# Patient Record
Sex: FEMALE | Race: WHITE | NOT HISPANIC OR LATINO | Employment: FULL TIME | URBAN - METROPOLITAN AREA
[De-identification: names, ages, dates, MRNs, and addresses within clinical notes are randomized per-mention and may not be internally consistent; named-entity substitution may affect disease eponyms.]

---

## 2023-12-26 ENCOUNTER — APPOINTMENT (OUTPATIENT)
Dept: PRIMARY CARE | Facility: CLINIC | Age: 23
End: 2023-12-26
Payer: COMMERCIAL

## 2024-01-18 ENCOUNTER — OFFICE VISIT (OUTPATIENT)
Dept: PRIMARY CARE | Facility: CLINIC | Age: 24
End: 2024-01-18
Payer: COMMERCIAL

## 2024-01-18 VITALS
DIASTOLIC BLOOD PRESSURE: 60 MMHG | HEIGHT: 63 IN | SYSTOLIC BLOOD PRESSURE: 98 MMHG | HEART RATE: 65 BPM | BODY MASS INDEX: 21.69 KG/M2 | TEMPERATURE: 97.7 F | OXYGEN SATURATION: 99 % | WEIGHT: 122.4 LBS

## 2024-01-18 DIAGNOSIS — D64.9 ANEMIA, UNSPECIFIED TYPE: ICD-10-CM

## 2024-01-18 DIAGNOSIS — Z00.00 ANNUAL PHYSICAL EXAM: Primary | ICD-10-CM

## 2024-01-18 DIAGNOSIS — Z80.3 FAMILY HISTORY OF BREAST CANCER IN MOTHER: ICD-10-CM

## 2024-01-18 DIAGNOSIS — G43.109 MIGRAINE WITH AURA AND WITHOUT STATUS MIGRAINOSUS, NOT INTRACTABLE: ICD-10-CM

## 2024-01-18 DIAGNOSIS — H53.19 VISUAL SNOW SYNDROME: ICD-10-CM

## 2024-01-18 DIAGNOSIS — Z83.49 FAMILY HISTORY OF 5,10-METHYLENETETRAHYDROFOLATE REDUCTASE (MTHFR) DEFICIENCY: ICD-10-CM

## 2024-01-18 PROBLEM — G44.89 OTHER HEADACHE SYNDROME: Status: ACTIVE | Noted: 2024-01-18

## 2024-01-18 PROBLEM — R19.7 DIARRHEA: Status: ACTIVE | Noted: 2020-08-18

## 2024-01-18 PROBLEM — L70.0 ACNE VULGARIS: Status: ACTIVE | Noted: 2024-01-18

## 2024-01-18 PROBLEM — M79.669 PAIN IN THE SHINS: Status: ACTIVE | Noted: 2024-01-18

## 2024-01-18 PROBLEM — F43.22 ADJUSTMENT DISORDER WITH ANXIETY: Status: ACTIVE | Noted: 2024-01-18

## 2024-01-18 PROBLEM — N92.6 IRREGULAR MENSES: Status: ACTIVE | Noted: 2024-01-18

## 2024-01-18 PROBLEM — J02.9 SORE THROAT: Status: ACTIVE | Noted: 2024-01-18

## 2024-01-18 PROBLEM — J03.90 ACUTE TONSILLITIS: Status: ACTIVE | Noted: 2024-01-18

## 2024-01-18 PROBLEM — N94.6 DYSMENORRHEA: Status: ACTIVE | Noted: 2024-01-18

## 2024-01-18 PROBLEM — H53.9 VISUAL DISTURBANCE: Status: ACTIVE | Noted: 2024-01-18

## 2024-01-18 PROBLEM — R63.4 ABNORMAL WEIGHT LOSS: Status: ACTIVE | Noted: 2024-01-18

## 2024-01-18 PROCEDURE — 1036F TOBACCO NON-USER: CPT | Performed by: NURSE PRACTITIONER

## 2024-01-18 PROCEDURE — 99385 PREV VISIT NEW AGE 18-39: CPT | Performed by: NURSE PRACTITIONER

## 2024-01-18 RX ORDER — CLINDAMYCIN HYDROCHLORIDE 300 MG/1
300 CAPSULE ORAL EVERY 8 HOURS
COMMUNITY
Start: 2023-06-29 | End: 2024-01-18 | Stop reason: ALTCHOICE

## 2024-01-18 RX ORDER — SPIRONOLACTONE 50 MG/1
TABLET, FILM COATED ORAL
COMMUNITY
End: 2024-01-18 | Stop reason: ALTCHOICE

## 2024-01-18 ASSESSMENT — PAIN SCALES - GENERAL: PAINLEVEL: 0-NO PAIN

## 2024-01-18 NOTE — PROGRESS NOTES
"Subjective   Patient ID: Cass Maxwell is a 23 y.o. female who presents for Annual Exam.    HPI  Pleasant 24 y/o female with PMH of Acne, anemia, Raynauds, Irregular menses presents for Annual and to establish care.  Current concerns include  Irregular periods, reports cycle is never consistent, reports high levels of pain where she is doubled over, can't walk, doesn't want to do anything, not resolved with otc medications. Reports Gyn workup for endometriosis in 2021 was negative with no further follow up  Not interested in birth control, discussed options to regulate periods  Endorses anemia but does not know why, no supplementation    Family history of breast cancer- Reports  mom  recently diagnosed with breast cancer at age 48, reports several other maternal family members also with breast cancer. Mom also recently found she is positive for the MTHFR deficiency    Raynauds- fingers and toes, exacerbated by the cold, well managed    Hx of migraine, Visual snow-reports she sees a haze in her vision \"like static with floaters\" all the time, has not had a vision exam? States she had a brain CT in high school to evaluate her symptoms  Migraines mostly resolved  Per EMR CT HEAD W/WO CONTRAST 2018 No acute intracranial process      Single lives with parents, safe  [employer] student in [employer]  no  Kids    Diet healthy, fruits or vegetables all meals  Caffeine 1  Water 64 oz  Exercise ski in Winter, teaches figure skating  Non-smoker  Alcohol No      Eye exam never, recommended she be evaluated  Dental exam 2023  Gyn no abnormal pap referral placed      Review of Systems  Review of Systems   Constitutional: Negative.    HENT: HPI   Respiratory: Negative.     Cardiovascular: Negative.    Gastrointestinal: Negative.    Genitourinary: HPI    Musculoskeletal: Negative.    Psychiatric/Behavioral: Negative.     All other systems reviewed and are negative.    .vsVisit Vitals  BP 98/60 (BP Location: Left arm, Patient " "Position: Sitting, BP Cuff Size: Adult)   Pulse 65   Temp 36.5 °C (97.7 °F) (Temporal)   Ht 1.6 m (5' 3\")   Wt 55.5 kg (122 lb 6.4 oz)   LMP 12/31/2023 (Exact Date)   SpO2 99%   BMI 21.68 kg/m²   OB Status Having periods   Smoking Status Never   BSA 1.57 m²         Objective   Physical Exam  HENT:      Head: Normocephalic and atraumatic.      Right Ear: Hearing and tympanic membrane normal.      Left Ear: Hearing and tympanic membrane normal.      Nose: Nose normal.      Mouth/Throat:      Lips: Pink.      Mouth: Mucous membranes are moist.      Pharynx: Oropharynx is clear. Uvula midline.         Physical Exam  Vitals reviewed.   Constitutional:       General: She is active.   Cardiovascular:      Rate and Rhythm: Normal rate and regular rhythm.   Pulmonary:      Effort: Pulmonary effort is normal.      Breath sounds: Normal breath sounds.   Abdominal:      General: Bowel sounds are normal.   Musculoskeletal:         General: Normal range of motion.      Cervical back: Neck supple.   Skin:     General: Skin is warm and dry.      Capillary Refill: Capillary refill takes less than 2 seconds.   Neurological:      General: No focal deficit present.      Mental Status: She is alert.     Assessment/Plan   Problem List Items Addressed This Visit       Anemia    Relevant Orders    Iron and TIBC    Vitamin B12    Migraine with aura and without status migrainosus, not intractable     Well controlled, no concern         Visual snow syndrome     Obtain complete eye exam         Annual physical exam - Primary    Relevant Orders    CBC    Comprehensive Metabolic Panel    Vitamin D 25-Hydroxy,Total (for eval of Vitamin D levels)    TSH with reflex to Free T4 if abnormal    Hemoglobin A1C    Lipid Panel    Homocysteine, serum    Iron and TIBC    Family history of breast cancer in mother     mom@48, other paternal family members  Mom currently undergoing genetic testing         Relevant Orders    Referral to Genetics     Other " Visit Diagnoses       Family history of 5,10-methylenetetrahydrofolate reductase (MTHFR) deficiency        Relevant Orders    Referral to Genetics

## 2024-01-18 NOTE — PATIENT INSTRUCTIONS
Get an eye exam    Health Tips for People in their 20's     Develop good health habits now  Don't put it off. With good health habits, you can prevent or reduce the likelihood of health-impacting conditions later in life, such as obesity, high blood pressure, heart disease, or diabetes. Establishing good health habits now is easier than having to begin these practices later on, or to have to break bad habits.      Establish a relationship with a primary care provider   A PCP can help you on your health journey. When you see a provider on a regular basis, you're more likely to feel comfortable about talking about sensitive issues as well as being receptive to the advice your provider offers, because you develop a trusting relationship. And by getting to know you over time, your doctor may be better able to  on signs of health concerns.      Know your family health history   Knowing your family's health history can help you and your primary care provider better manage your health - and be aware of potential hereditary risks to be watching for.  Things like migraines or even family members with certain cancers and heart attack can increase your risk.       Get regular health screenings and Keep up with immunizations. This includes the HPV vaccine, for men and women.   For women: Monthly Self breast exams, See Gynecology for a Pap smear; HPV screening for the virus that causes genital warts, which can lead to cervical cancer   For men: Monthly Self testicular exams.   For both: sexually transmitted disease testing, if sexually active. Remember to use birth control to prevent and to protect. Talk with your health care provider about the screening schedule that's best for you.    Have good for you people in your life  Its all about a few good friends over a lot of not so good friends. If you are close to your family stay that way, if not then develop new relationships that help you to grow and thrive. Often people  make friends at work, [employer], community groups  Developing and maintaining a work-life balance that allows room for friendships and relationships can have a positive impact on your mental and emotional health.     Be a numbers person  Keep tabs on numbers that affect your health, like weight, blood pressure, the amount of calories you consume, and cholesterol. Your health care provider can help you with this.      Pay attention to the risk of a few extra pounds.  If you gain four or five pounds every year, it doesn't seem like a lot, but at the end of 10 years, you've added 40 or 50 pounds - and in 15 to 20 years, you have 75 to 100 extra pounds that you're carrying!  *Choose a life of healthful eating over trendy diets. The more effective approach: adopt a life-time practice of eating a balanced, nutritional diet that includes vegetables, fruits, lean meats, whole grains, low-fat dairy, nuts and legumes, and non-tropical vegetable oils. And limit sweets.   *Practice portion control. There's more to healthy eating than choosing nutritious food. There's also limiting how much you eat, even if you're eating incredibly healthy food *Remember, your metabolic rate slows as you age. That is, your body becomes less efficient in burning calories.     Stay active   If you're exercising on a regular basis, that is going to help with a lot of health problems that are related to lifestyle.  You don't have to be an athlete, start with a walking program. Even 10 minutes of good exercise is beneficial. Don't forget weight training. Any Weights 3 days a week maintains bone health and helps to burn calories    Get enough sleep  For most that means seven to nine hours a night.   Sleep affects your ability to learn new information and to memorize and process information. Reaction time is adversely affected by too little sleep (a big safety risk similar to drinking alcohol). In the long run, sleep makes a big difference in how you  function and succeed     Mood impacts your overall health  People who are struggling with depression, anxiety, and self-esteem issues really have a lot of difficulty with their health. When depressed, you may not be motivated and may not see the value of taking care of your health. Self Care, Exercise and friendships can help reduce your risk of mental and emotional health issues, and when you need it, your health care provider can help you get professional help.      Don't vape  Vaping is a big problem ,it's not just flavored water, nicotine comes from tobacco so you are in essence still smoking. Also, we don't know about the effects of what's in vaping cartridges, and sometimes they're modified with substances that can cause lung failure. Cigarettes are even worse with known cancer causing chemicals  2 ml of vape juice is equal to 1 pack of cigarettes    Think twice about marijuana  Even in states where marijuana is legal (medically or recreationally) it doesn't mean your employer is going to think it's OK.   Like alcohol, marijuana affects your reasoning, decision-making, and ability to operate equipment safely

## 2024-01-23 ENCOUNTER — APPOINTMENT (OUTPATIENT)
Dept: PRIMARY CARE | Facility: CLINIC | Age: 24
End: 2024-01-23
Payer: COMMERCIAL

## 2024-01-25 ENCOUNTER — CLINICAL SUPPORT (OUTPATIENT)
Dept: PRIMARY CARE | Facility: CLINIC | Age: 24
End: 2024-01-25
Payer: COMMERCIAL

## 2024-01-25 DIAGNOSIS — D64.9 ANEMIA, UNSPECIFIED TYPE: ICD-10-CM

## 2024-01-25 DIAGNOSIS — Z00.00 ANNUAL PHYSICAL EXAM: ICD-10-CM

## 2024-01-25 LAB
25(OH)D3 SERPL-MCNC: 31 NG/ML (ref 30–100)
ALBUMIN SERPL BCP-MCNC: 4.8 G/DL (ref 3.4–5)
ALP SERPL-CCNC: 45 U/L (ref 33–110)
ALT SERPL W P-5'-P-CCNC: 10 U/L (ref 7–45)
ANION GAP SERPL CALC-SCNC: 12 MMOL/L (ref 10–20)
AST SERPL W P-5'-P-CCNC: 16 U/L (ref 9–39)
BILIRUB SERPL-MCNC: 0.5 MG/DL (ref 0–1.2)
BUN SERPL-MCNC: 12 MG/DL (ref 6–23)
CALCIUM SERPL-MCNC: 9.9 MG/DL (ref 8.6–10.6)
CHLORIDE SERPL-SCNC: 105 MMOL/L (ref 98–107)
CHOLEST SERPL-MCNC: 157 MG/DL (ref 0–199)
CHOLESTEROL/HDL RATIO: 2.5
CO2 SERPL-SCNC: 27 MMOL/L (ref 21–32)
CREAT SERPL-MCNC: 0.69 MG/DL (ref 0.5–1.05)
EGFRCR SERPLBLD CKD-EPI 2021: >90 ML/MIN/1.73M*2
ERYTHROCYTE [DISTWIDTH] IN BLOOD BY AUTOMATED COUNT: 12.4 % (ref 11.5–14.5)
EST. AVERAGE GLUCOSE BLD GHB EST-MCNC: 94 MG/DL
GLUCOSE SERPL-MCNC: 81 MG/DL (ref 74–99)
HBA1C MFR BLD: 4.9 %
HCT VFR BLD AUTO: 32.5 % (ref 36–46)
HDLC SERPL-MCNC: 62.5 MG/DL
HGB BLD-MCNC: 11 G/DL (ref 12–16)
IRON SATN MFR SERPL: 23 % (ref 25–45)
IRON SERPL-MCNC: 68 UG/DL (ref 35–150)
LDLC SERPL CALC-MCNC: 86 MG/DL
MCH RBC QN AUTO: 29.8 PG (ref 26–34)
MCHC RBC AUTO-ENTMCNC: 33.8 G/DL (ref 32–36)
MCV RBC AUTO: 88 FL (ref 80–100)
NON HDL CHOLESTEROL: 95 MG/DL (ref 0–149)
NRBC BLD-RTO: 0 /100 WBCS (ref 0–0)
PLATELET # BLD AUTO: 185 X10*3/UL (ref 150–450)
POTASSIUM SERPL-SCNC: 4.1 MMOL/L (ref 3.5–5.3)
PROT SERPL-MCNC: 7.4 G/DL (ref 6.4–8.2)
RBC # BLD AUTO: 3.69 X10*6/UL (ref 4–5.2)
SODIUM SERPL-SCNC: 140 MMOL/L (ref 136–145)
TIBC SERPL-MCNC: 293 UG/DL (ref 240–445)
TRIGL SERPL-MCNC: 45 MG/DL (ref 0–149)
TSH SERPL-ACNC: 1.08 MIU/L (ref 0.44–3.98)
UIBC SERPL-MCNC: 225 UG/DL (ref 110–370)
VIT B12 SERPL-MCNC: 452 PG/ML (ref 211–911)
VLDL: 9 MG/DL (ref 0–40)
WBC # BLD AUTO: 4.3 X10*3/UL (ref 4.4–11.3)

## 2024-01-25 PROCEDURE — 36415 COLL VENOUS BLD VENIPUNCTURE: CPT

## 2024-01-25 PROCEDURE — 83550 IRON BINDING TEST: CPT

## 2024-01-25 PROCEDURE — 85027 COMPLETE CBC AUTOMATED: CPT

## 2024-01-25 PROCEDURE — 82306 VITAMIN D 25 HYDROXY: CPT

## 2024-01-25 PROCEDURE — 82607 VITAMIN B-12: CPT

## 2024-01-25 PROCEDURE — 80053 COMPREHEN METABOLIC PANEL: CPT

## 2024-01-25 PROCEDURE — 80061 LIPID PANEL: CPT

## 2024-01-25 PROCEDURE — 83540 ASSAY OF IRON: CPT

## 2024-01-25 PROCEDURE — 84443 ASSAY THYROID STIM HORMONE: CPT

## 2024-01-25 PROCEDURE — 83036 HEMOGLOBIN GLYCOSYLATED A1C: CPT

## 2024-02-13 DIAGNOSIS — Z82.71 FAMILY HISTORY OF POLYCYSTIC KIDNEY: Primary | ICD-10-CM

## 2024-03-01 ENCOUNTER — CLINICAL SUPPORT (OUTPATIENT)
Dept: GENETICS | Facility: CLINIC | Age: 24
End: 2024-03-01
Payer: COMMERCIAL

## 2024-03-01 VITALS
BODY MASS INDEX: 21.53 KG/M2 | SYSTOLIC BLOOD PRESSURE: 103 MMHG | WEIGHT: 121.5 LBS | DIASTOLIC BLOOD PRESSURE: 69 MMHG | TEMPERATURE: 98.2 F | HEART RATE: 70 BPM | HEIGHT: 63 IN

## 2024-03-01 DIAGNOSIS — Z80.3 FAMILY HISTORY OF BREAST CANCER IN MOTHER: ICD-10-CM

## 2024-03-01 DIAGNOSIS — Z83.49 FAMILY HISTORY OF 5,10-METHYLENETETRAHYDROFOLATE REDUCTASE (MTHFR) DEFICIENCY: ICD-10-CM

## 2024-03-01 PROCEDURE — 96040 PR MEDICAL GENETICS COUNSELING EACH 30 MINUTES: CPT | Performed by: GENETIC COUNSELOR, MS

## 2024-03-01 ASSESSMENT — ENCOUNTER SYMPTOMS
LOSS OF SENSATION IN FEET: 0
DEPRESSION: 0
OCCASIONAL FEELINGS OF UNSTEADINESS: 0

## 2024-03-01 ASSESSMENT — PATIENT HEALTH QUESTIONNAIRE - PHQ9
2. FEELING DOWN, DEPRESSED OR HOPELESS: NOT AT ALL
1. LITTLE INTEREST OR PLEASURE IN DOING THINGS: NOT AT ALL
SUM OF ALL RESPONSES TO PHQ9 QUESTIONS 1 AND 2: 0

## 2024-03-01 NOTE — PROGRESS NOTES
Subjective   Patient ID: Cass Maxwell is a 23 y.o. female who presents for cancer genetic counseling due to a family history of breast cancer in her mother and maternal grandmother. We reviewed the hereditary aspects of cancer, and the genetic testing options available to Cass.    CANCER MEDICAL HISTORY  Personal history of cancer?  -no    PREVIOUS GENETIC TESTING?  -no    CANCER SCREENING HISTORY  Mammograms/MRIS?  -no    Breast biopsies?  -no    Ovarian cancer screening (ultrasound or CA-125)?  -no    PAP smear?  -no    Colonoscopy?  -no    Endoscopy?  -no    Hysterectomy?  -no    Oophorectomy?  -no    Dermatology?  -no    Other cancer screening?  -no    Radiation?  -no    OTHER MEDICAL CONCERNS  -no    REPRODUCTIVE HISTORY  # pregnancies: 0    Menarche: 15    Menopause: no    OCP: no    HRT: no    SOCIAL HISTORY  Smoking: never    Alcohol use: none    Career: engineering student at [employer]    FAMILY HISTORY:  A 4-generation pedigree was obtained. The family history was significant for the following:    -Mother with breast cancer diagnosed at 48, now 49.  -Maternal grandmother with breast cancer diagnosed in her 50s, diagnosed with breast cancer in opposite breast in her 70s.    -Additionally, Cass noted her father has a history of kidney cysts. She reports no other family members with kidney cysts or disease. We reviewed that while many cases of renal cysts are sporadic, there are some that can be due to a hereditary conditions such as autosomal dominant polycystic kidney disease. Cass's father could consider genetic consultation to help determine the etiology and recurrence risks for his kidney cysts. Cass may also consider renal ultrasound to screen for kidney cysts in herself. This can be ordered by her PCP.    Consanguinity and Ashkenazi Mandaen ancestry were denied. The patient is of Serbian/English/Nepali/Liberian/Occitan/Greenlandic descent. The remainder of the family history was negative for intellectual  disability, birth defects, miscarriages, stillbirths, blindness, deafness, kidney disease, heart disease, cancer, muscle disease, and blood disorders.    Assessment/Plan   Genetic Test Ordered: mary Maxwell is a 23 year old woman with a family history of breast cancer in her mother at age 48, and bilateral breast cancer in her maternal grandmother. Based on this history of breast cancer, Cass meets NCCN criteria for testing of hereditary breast and ovarian cancer genes. After discussion, Cass declined testing for today, and opted to discuss testing further with her maternal family. Our discussion is summarized below.    We reviewed genes and chromosomes, inherited forms of breast cancer, and the BRCA1 and BRCA2 genes. We discussed that most cancers are not due to an inherited genetic susceptibility. However, in about 5-10% of families, there is an inherited genetic mutation that can make a person more susceptible to developing certain forms of cancer. Within these families, we often see multiple family members with cancer, occurring in multiple generations. In addition, earlier onset and bilateral cancers are suggestive of an inherited form of cancer. Finally, there is a clustering of certain types of cancer in these families, such as breast and ovarian cancer.    We discussed the BRCA1 and BRCA2 genes, which are two genes that have been linked to early-onset breast and/or ovarian cancer. Changes in these genes (sometimes referred to as mutations) are inherited in a dominant pattern and confer up to an 87% lifetime risk for breast cancer. This is elevated compared to the general population risk of 10-12%. In addition, BRCA1 and BRCA2 mutation carriers have up to a 45% lifetime risk for ovarian cancer, which is elevated over the 1.3% general population risk. Mutation carriers who have already been diagnosed with cancer have an increased risk to develop a second, contralateral breast cancer. BRCA2 gene  mutation carriers have an increased risk for male breast cancer, prostate cancer, melanoma and pancreatic cancer.    Gene mutations in BRCA1 and BRCA2 are inherited in an autosomal dominant fashion. This means that if an individual has a change in either of these genes, their siblings and children have a 50% chance of also having that gene change and a 50% chance of not having the gene change.    We also discussed that there are multiple genes associated with increased cancer risk.  Some genes, like the BRCA1 and BRCA2 genes, are considered highly penetrant breast and ovarian cancer genes, meaning a mutation in the gene confers a high risk of breast cancer. Additionally, there are other intermediate (moderate risk) breast cancer genes. There is often limited information regarding the degree to which a mutation in the gene affects risk of different types of cancers. For some of these moderate risk genes, the appropriate management for individuals who have a mutation in one of these genes is not always clear. Our knowledge about the cancer risks associated with mutations in these moderate risk genes is always growing, and we will likely be able to provide more comprehensive information in the future.    Cass was counseled about hereditary cancer susceptibility including cancer risks, options for increased screening and/or risk reduction, genetic testing, and the implications for other family members. We discussed genetic testing in detail, including the benefits, risks, and limitations of testing. We also reviewed the various possible test results, including positive, negative, and variant of unknown significance (VUS). We reviewed the limitations of testing an unaffected individual for a hereditary cancer syndrome. When testing an individual who has not had a cancer diagnosis, like Cass, a negative test result may have limited utility in defining future cancer risks, as it is unclear whether the affected family  members had a mutation in one of the genes that the unaffected individual did not inherit, or alternatively, whether there is another untested risk factor to explain the family history of cancer that is shared between the unaffected individual and their family members. Therefore, if possible, testing Cass's mother first is the preferable genetic testing strategy.    We discussed the Genetic Information Non-discrimination Act (TOYA) of 2008. We discussed that per this federal law, employers (at companies with 15 employees or greater) and health insurance companies (barring [employer] and other  insurances) are forbidden to ask for and use genetic information against another person. As such, health insurance companies cannot ask for genetic information and use those findings to affect coverage or rates. However, luxury insurances such as life insurance, long term care insurance, and/or private disability insurance companies are not forbidden against using genetic information when an individual takes out a new/additional policy in one of those areas.    After discussion, Cass elected to decline genetic testing today, and review the options for testing with her family more thoroughly at home. We reviewed that even without genetic testing, Cass would be considered at an increased risk to develop breast cancer, and that she should inform her care providers about her family history, as she may qualify for increased breast cancer screening starting at 38 (10 years earlier than the youngest diagnosis). She should also continue to follow other general population cancer screening guidelines, such as PAP smears, colonoscopies, etc. We provided our contact information for any questions or concerns following our discussion today.    Inga Beltran MS [facility] [place]  Licensed Genetic Counselor  [facility]  [phone]    Reviewed by:  Samantha Quiroz MD  Clinical   [facility]  [phone]    Total time spent on day of encounter: 43 minutes

## 2024-09-06 ENCOUNTER — TELEPHONE (OUTPATIENT)
Dept: OBSTETRICS AND GYNECOLOGY | Facility: CLINIC | Age: 24
End: 2024-09-06
Payer: COMMERCIAL

## 2024-09-13 ENCOUNTER — APPOINTMENT (OUTPATIENT)
Dept: OBSTETRICS AND GYNECOLOGY | Facility: CLINIC | Age: 24
End: 2024-09-13
Payer: COMMERCIAL

## 2024-09-13 ENCOUNTER — PREP FOR PROCEDURE (OUTPATIENT)
Dept: OBSTETRICS AND GYNECOLOGY | Facility: CLINIC | Age: 24
End: 2024-09-13

## 2024-09-24 ENCOUNTER — TELEPHONE (OUTPATIENT)
Dept: PRIMARY CARE | Facility: CLINIC | Age: 24
End: 2024-09-24
Payer: COMMERCIAL

## 2024-09-24 DIAGNOSIS — Z02.5 SPORTS PHYSICAL: Primary | ICD-10-CM

## 2024-09-24 DIAGNOSIS — Z00.00 ANNUAL PHYSICAL EXAM: ICD-10-CM

## 2024-09-24 NOTE — TELEPHONE ENCOUNTER
Patient called in and wants to know if you can put orders in the computer for labs for sickle cell. Its required for running track for school. She state or she can go get it done at [facility] [employer].

## 2024-10-18 ENCOUNTER — TELEPHONE (OUTPATIENT)
Dept: OBSTETRICS AND GYNECOLOGY | Facility: CLINIC | Age: 24
End: 2024-10-18
Payer: COMMERCIAL

## 2024-10-24 ENCOUNTER — APPOINTMENT (OUTPATIENT)
Dept: OBSTETRICS AND GYNECOLOGY | Facility: CLINIC | Age: 24
End: 2024-10-24
Payer: COMMERCIAL

## 2024-10-24 VITALS
BODY MASS INDEX: 20.73 KG/M2 | WEIGHT: 117 LBS | DIASTOLIC BLOOD PRESSURE: 60 MMHG | SYSTOLIC BLOOD PRESSURE: 100 MMHG | HEIGHT: 63 IN

## 2024-10-24 DIAGNOSIS — N92.6 IRREGULAR MENSES: Primary | ICD-10-CM

## 2024-10-24 DIAGNOSIS — N94.10 DYSPAREUNIA IN FEMALE: ICD-10-CM

## 2024-10-24 PROCEDURE — 99203 OFFICE O/P NEW LOW 30 MIN: CPT | Performed by: OBSTETRICS & GYNECOLOGY

## 2024-10-24 PROCEDURE — 3008F BODY MASS INDEX DOCD: CPT | Performed by: OBSTETRICS & GYNECOLOGY

## 2024-10-24 PROCEDURE — 1036F TOBACCO NON-USER: CPT | Performed by: OBSTETRICS & GYNECOLOGY

## 2024-10-24 NOTE — PROGRESS NOTES
"Subjective   Patient ID: Cass Maxwell is a 23 y.o. female who presents for Menstrual Problem (Painful, irregular periods ).    23-year-old presents today for irregular menstrual cycles  Menarche was 15  Cycles are irregular - every 3-5 wks  Flow last 3 to 7 days    Sexually active:  condoms     Patient is not interested in birth control  Discussed with patient that her cycles are within the 21 to 35-day window.  Flow is also considered normal  Her cycles are irregular but she is having 1 within our parameters  Is not requiring treatment if she does not want birth control    Patient also with occasional dyspareunia  Does have a boyfriend.  Same partner  Advise she use lubricant if needed, as she does not always have discomfort but sometimes    ROS  All Normal Review of Systems  Constitutional: no fever, no chills, no recent weight gain, no recent weight loss and no fatigue.    Gastrointestinal: no abdominal pain, no constipation, no nausea, no diarrhea and no vomiting.    Genitourinary: no dysuria, no urinary incontinence, no vaginal dryness, no vaginal itching, no dyspareunia, no pelvic pain, no dysmenorrhea, no sexual problems, no change in urinary frequency, no vaginal discharge, no unexplained vaginal bleeding and no lesion/sore.    Breasts: No masses.  No nipple discharge.  No redness    Objective   /60   Ht 1.6 m (5' 3\")   Wt 53.1 kg (117 lb)   LMP 10/11/2024 Comment: 09/08/2024, 07/26/2024  BMI 20.73 kg/m²        Assessment/Plan   1) irregular menstrual cycles  Cycles are within the 21 to 35-day range  Does not want birth control  Is using condoms.  Encourage condoms as were not sure when she is ovulating  Will continue to keep track of her cycles on a calendar    2) Dyspareunia  Is only occasional  Patient reassured and encouraged lubrication at times      Thank you for your visit to our office today.     "

## 2024-11-25 ENCOUNTER — OFFICE VISIT (OUTPATIENT)
Dept: PRIMARY CARE | Facility: CLINIC | Age: 24
End: 2024-11-25
Payer: COMMERCIAL

## 2024-11-25 VITALS
WEIGHT: 120.4 LBS | SYSTOLIC BLOOD PRESSURE: 99 MMHG | HEART RATE: 70 BPM | BODY MASS INDEX: 21.33 KG/M2 | RESPIRATION RATE: 16 BRPM | DIASTOLIC BLOOD PRESSURE: 64 MMHG | HEIGHT: 63 IN | TEMPERATURE: 97.8 F | OXYGEN SATURATION: 97 %

## 2024-11-25 DIAGNOSIS — H01.004 BLEPHARITIS OF LEFT UPPER EYELID, UNSPECIFIED TYPE: Primary | ICD-10-CM

## 2024-11-25 DIAGNOSIS — J02.9 SORE THROAT: ICD-10-CM

## 2024-11-25 PROBLEM — L70.9 ACNE: Status: ACTIVE | Noted: 2024-11-25

## 2024-11-25 PROCEDURE — 99213 OFFICE O/P EST LOW 20 MIN: CPT | Performed by: NURSE PRACTITIONER

## 2024-11-25 PROCEDURE — 3008F BODY MASS INDEX DOCD: CPT | Performed by: NURSE PRACTITIONER

## 2024-11-25 RX ORDER — ERYTHROMYCIN 5 MG/G
OINTMENT OPHTHALMIC 4 TIMES DAILY
Qty: 3.5 G | Refills: 0 | Status: SHIPPED | OUTPATIENT
Start: 2024-11-25 | End: 2024-12-02

## 2024-11-25 ASSESSMENT — COLUMBIA-SUICIDE SEVERITY RATING SCALE - C-SSRS
2. HAVE YOU ACTUALLY HAD ANY THOUGHTS OF KILLING YOURSELF?: NO
6. HAVE YOU EVER DONE ANYTHING, STARTED TO DO ANYTHING, OR PREPARED TO DO ANYTHING TO END YOUR LIFE?: NO
1. IN THE PAST MONTH, HAVE YOU WISHED YOU WERE DEAD OR WISHED YOU COULD GO TO SLEEP AND NOT WAKE UP?: NO

## 2024-11-25 ASSESSMENT — ENCOUNTER SYMPTOMS
LOSS OF SENSATION IN FEET: 0
OCCASIONAL FEELINGS OF UNSTEADINESS: 0

## 2024-11-25 ASSESSMENT — PATIENT HEALTH QUESTIONNAIRE - PHQ9
SUM OF ALL RESPONSES TO PHQ9 QUESTIONS 1 AND 2: 0
2. FEELING DOWN, DEPRESSED OR HOPELESS: NOT AT ALL
1. LITTLE INTEREST OR PLEASURE IN DOING THINGS: NOT AT ALL

## 2024-11-25 ASSESSMENT — PAIN SCALES - GENERAL: PAINLEVEL_OUTOF10: 4

## 2024-11-25 ASSESSMENT — LIFESTYLE VARIABLES
HOW OFTEN DO YOU HAVE A DRINK CONTAINING ALCOHOL: NEVER
HOW MANY STANDARD DRINKS CONTAINING ALCOHOL DO YOU HAVE ON A TYPICAL DAY: PATIENT DOES NOT DRINK
AUDIT-C TOTAL SCORE: 0
SKIP TO QUESTIONS 9-10: 1
HOW OFTEN DO YOU HAVE SIX OR MORE DRINKS ON ONE OCCASION: NEVER

## 2024-11-25 NOTE — PROGRESS NOTES
"Subjective   Patient ID: Cass Maxwell is a 23 y.o. female who presents for Eye Pain (Patient presents for c/o  left eye pain r/t scratch by her dog.).  HPI 23-year-old female presents today with left upper eyelid - blepharitis after presumptively being scratched by her dog approximately 3 days ago. Left upper eyelid has increased in swelling and tenderness over the past 3 days.  No change in vision.   No injected conjunctiva or change in vision.     No scratch marks or noted point of entry.   Left upper lid with mid lid swelling worse than day before per patient.   No evidence of stye.     She does have allergies to PCN   Discussed with patient and mother - compresses, no makeup.       Review of Systems  Review of systems: Present-feeling well. Not present-chills, fatigue and fever.  Skin: Not present- new lesions and rash.  HEENT: See HPI.  Not present-headache, diplopia, visual loss, ear pain, nasal congestion, and sore throat.  Neck: Not present-neck pain, neck stiffness and swollen glands.  Respiratory: Not present-difficulty breathing, cough, bloody sputum.  Cardiovascular: Not present-chest pain, edema, palpitations, dyspnea on exertion.  Gastrointestinal: Not present-abdominal pain, bloody or very black stools,  nausea and vomiting.  Genitourinary: Not present-change in bladder habits, hematuria, flank pain and dysuria.  Musculoskeletal: Not present-joint pain, joint swelling, joint redness.    Objective   BP 99/64   Pulse 70   Temp 36.6 °C (97.8 °F) (Temporal)   Resp 16   Ht 1.6 m (5' 3\")   Wt 54.6 kg (120 lb 6.4 oz)   LMP 10/11/2024   SpO2 97%   BMI 21.33 kg/m²      Physical Exam  Gen.: Mental status-alert. Gen. appearance-cooperative, well groomed and consistent with stated age. Not in acute distress or sickly. Orientation-oriented to time, place, purpose and person. Build and nutrition-well-nourished and well-developed. Hydration-well-hydrated.    Integumentary: Color-normal coloration skin. Skin " moisture-normal skin moisture.    Head and neck: Head-normocephalic, atraumatic with no lesions or palpable masses. Face-atraumatic. Neck-full range of motion and subtle. No lymphadenopathy and no nuchal rigidity.     EYES: PERRLA, EOMI - non injected conjunctiva. Mild mid left upper eyelid swelling, tenderness and pink in color.   No stye.  No scratches or marking.  Normal conjunctive.   In office eye exam within normal limits for vision.      Chest and lung exam: Auscultation-normal breath sounds, no adventitious lung sounds. Chest wall is normal in shape and non-tender.    Cardiovascular: Auscultation: Regular rate and rhythm. Heart sounds-normal heart sounds, S1-S2. No murmurs.    Musculoskeletal: Examination of the spine with no step-offs or point tenderness.  Full range of motion of the spine without pain or difficulty. Right lower extremity-normal strength and tone, normal range of motion without pain. Left lower extremity-normal strength and tone, normal range of motion without pain.  Assessment/Plan   Diagnoses and all orders for this visit:  Blepharitis of left upper eyelid, unspecified type  -     erythromycin (Romycin) 5 mg/gram (0.5 %) ophthalmic ointment; Apply to left eye 4 times a day for 7 days. Apply Amount per Dose: 0.5 inch (~1 cm) per dose.  -     Eye exam

## 2024-11-25 NOTE — PATIENT INSTRUCTIONS
Left blepharitis x 3 days - worsening - No evidence of stye.   Initial picture with evidence of small area of bruising initially after being scratched by dog.   Questionable make up.   In office eye exam unremarkable for vision change  Conjunctiva is not injected.    She is to continue with cold compresses to bring swelling down.  See attached patient education   May use Erythromycin cream as directed   Contact office with worsening condition or no resolve over the next 72 hours.

## 2024-11-26 ENCOUNTER — TELEPHONE (OUTPATIENT)
Dept: PRIMARY CARE | Facility: CLINIC | Age: 24
End: 2024-11-26
Payer: COMMERCIAL

## 2024-11-26 DIAGNOSIS — J02.9 SORE THROAT: Primary | ICD-10-CM

## 2024-11-26 DIAGNOSIS — H01.004 BLEPHARITIS OF LEFT UPPER EYELID, UNSPECIFIED TYPE: ICD-10-CM

## 2024-11-26 RX ORDER — AZITHROMYCIN 250 MG/1
TABLET, FILM COATED ORAL
Qty: 6 TABLET | Refills: 0 | Status: SHIPPED | OUTPATIENT
Start: 2024-11-26 | End: 2024-12-01

## 2024-11-26 NOTE — TELEPHONE ENCOUNTER
Mom called stated that eyelid is worse though not red.   She states she also had sore throat and feels chilled.   She is requesting oral antibiotic instead.     Zpak sent to pharmacy

## 2025-01-20 ENCOUNTER — APPOINTMENT (OUTPATIENT)
Dept: PRIMARY CARE | Facility: CLINIC | Age: 25
End: 2025-01-20
Payer: COMMERCIAL

## 2025-05-23 ENCOUNTER — HOSPITAL ENCOUNTER (OUTPATIENT)
Dept: RADIOLOGY | Facility: CLINIC | Age: 25
Discharge: HOME | End: 2025-05-23
Payer: COMMERCIAL

## 2025-05-23 ENCOUNTER — OFFICE VISIT (OUTPATIENT)
Dept: ORTHOPEDIC SURGERY | Facility: CLINIC | Age: 25
End: 2025-05-23
Payer: COMMERCIAL

## 2025-05-23 VITALS — HEIGHT: 63 IN | BODY MASS INDEX: 22.15 KG/M2 | WEIGHT: 125 LBS

## 2025-05-23 DIAGNOSIS — S93.602A FOOT SPRAIN, LEFT, INITIAL ENCOUNTER: Primary | ICD-10-CM

## 2025-05-23 DIAGNOSIS — M79.672 LEFT FOOT PAIN: ICD-10-CM

## 2025-05-23 PROCEDURE — 73630 X-RAY EXAM OF FOOT: CPT | Mod: LEFT SIDE | Performed by: RADIOLOGY

## 2025-05-23 PROCEDURE — 3008F BODY MASS INDEX DOCD: CPT

## 2025-05-23 PROCEDURE — 1036F TOBACCO NON-USER: CPT

## 2025-05-23 PROCEDURE — 73630 X-RAY EXAM OF FOOT: CPT | Mod: LT

## 2025-05-23 PROCEDURE — 99213 OFFICE O/P EST LOW 20 MIN: CPT

## 2025-05-24 NOTE — PROGRESS NOTES
Subjective    Patient ID: Cass Maxwell is a 24 y.o. female.    Chief Complaint: OTHER (LT FOOT PAIN SINCE MAY 11TH/PATIENT HURT FOOT TAKING OFF AT TRACK)    HPI  This is a pleasant 24-year-old female presenting to the walk-in injury clinic for evaluation of a left foot injury, which was sustained May 11, 2025.  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  States that at a track meet, her left foot hit the track harder than usual.  At the time, she did not noted any bruising swelling redness or warmth.  She was able to complete her track meet.  Since the injury, she states has been difficult for her to step directly onto the medial aspect of her foot.  States that she was putting more weight on her forefoot when walking and is still experiencing pain.  Presents to the office today for evaluation.  Initially after the injury, she was taking anti-inflammatories and elevating and applying ice, but has not done any more recent treatment.  She recently graduated from [employer].      The patient's past medical, surgical, family, and social history as well as allergies and medications were reviewed and updated in the chart.    Objective   Ortho Exam  Pleasant in no acute distress.  Walks in the office today with a normal gait.  Left foot and ankle appearing without soft tissue swelling erythema or ecchymosis.  There is no warmth upon touch and skin is intact.  Patient is minimally tender upon palpation of medial dorsal foot.  No other significant areas of tenderness.  No tenderness to medial or lateral malleolus or Achilles tendon.  She has full range of motion of the left foot and ankle in terms of dorsiflexion plantarflexion inversion eversion.  She has adequate strength with resisted dorsiflexion and plantarflexion.  The ankle joint is stable.  Sensation is intact to light touch.    Image Results:  Multiple view x-rays of the left foot obtained today personally reviewed, without  evidence of acute fracture or dislocation.  No bony abnormality noted.    Assessment/Plan   Encounter Diagnoses:  Foot sprain, left, initial encounter    Left foot pain     plan: Discussion with patient in regards to left foot sprain with review of today's x-rays.  Explained to patient that these type of injuries can take 6 to 8 weeks to fully heal.  As she is having difficulty fully weightbearing on left lower extremity, I have advised use of a short walking boot to use at all times with ambulation over the next 2 weeks.  She can remove the boot in the comfort of her home, to ice and elevate and for hygiene purposes.  She can take ibuprofen and Tylenol for any breakthrough pain inflammation.  She can use topical creams.  I have provided patient a referral to a foot and ankle specialist if pain does not improve.        Orders Placed This Encounter    Walking Boot Short    XR foot left 3+ views    Referral to Orthopedics and Sports Medicine     No follow-ups on file.